# Patient Record
Sex: FEMALE
[De-identification: names, ages, dates, MRNs, and addresses within clinical notes are randomized per-mention and may not be internally consistent; named-entity substitution may affect disease eponyms.]

---

## 2022-06-23 ENCOUNTER — APPOINTMENT (OUTPATIENT)
Dept: BARIATRICS | Facility: CLINIC | Age: 47
End: 2022-06-23

## 2022-06-23 VITALS
DIASTOLIC BLOOD PRESSURE: 90 MMHG | TEMPERATURE: 97.6 F | HEIGHT: 61.5 IN | BODY MASS INDEX: 38.4 KG/M2 | WEIGHT: 206 LBS | OXYGEN SATURATION: 96 % | HEART RATE: 95 BPM | SYSTOLIC BLOOD PRESSURE: 137 MMHG

## 2022-06-23 DIAGNOSIS — K21.9 GASTRO-ESOPHAGEAL REFLUX DISEASE W/OUT ESOPHAGITIS: ICD-10-CM

## 2022-06-23 DIAGNOSIS — E66.01 MORBID (SEVERE) OBESITY DUE TO EXCESS CALORIES: ICD-10-CM

## 2022-06-23 DIAGNOSIS — Z00.00 ENCOUNTER FOR GENERAL ADULT MEDICAL EXAMINATION W/OUT ABNORMAL FINDINGS: ICD-10-CM

## 2022-06-23 PROCEDURE — 99204 OFFICE O/P NEW MOD 45 MIN: CPT

## 2022-06-29 NOTE — HISTORY OF PRESENT ILLNESS
[de-identified] : Patient is a 47 year year old F with pmhx of morbid obesity (BMI 38) and chronic GERD who presents today for initial bariatric consult. Patient c/o of severe acid reflux, heartburn and cough due to which she has difficulty sleeping. States she has had this problem for many years. Patient is currently on omeprazole for acid reflux. She has a PSHx of ear surgery in her childhood. She has been following up with EGD every 6 months (concerning for Crowe's, will obtain egd results and path) with her GI and has a study for her heart tomorrow. States she has a cardiac study tomorrow. Will obtain egd reports, send for cardiac and nutrition evaluation in preparation for possible medical necessity RYGB sx for severe gerd/possible crowe's esophagus.\par \par We discussed at length surgical and non-surgical options and that non surgical approaches are unlikely to lead to long term, sustained weight loss. We also discussed that surgery alone is unlikely to be successful but should rather be seen as a tool for weight loss to be integrated with physical activity and nutritional counseling. The patient verbalized understanding and agrees to proceed with the evaluation. All risks of surgery were explained to the patient including the risks of leaks, infections, blood clots and death.\par \par

## 2022-06-29 NOTE — ASSESSMENT
[FreeTextEntry1] : Pt is a 47 year year old F with a PMHx of morbid obesity (BMI 38) and PSHx of ear surgery who presents today doing well. Will begin bariatric workup including sleep study to assess obesity-related disordered breathing.\par

## 2022-06-29 NOTE — END OF VISIT
[FreeTextEntry3] : All medical record entries made by the Scribe were at my, OLGA Loera , direction and personally dictated by me on 06/23/2022 . I have reviewed the chart and agree that the record accurately reflects my personal performance of the history, physical exam, assessment and plan. I have also personally directed, reviewed, and agreed with the chart.\par  [Time Spent: ___ minutes] : I have spent [unfilled] minutes of time on the encounter.

## 2022-07-25 ENCOUNTER — APPOINTMENT (OUTPATIENT)
Dept: BARIATRICS | Facility: CLINIC | Age: 47
End: 2022-07-25

## 2022-08-08 ENCOUNTER — APPOINTMENT (OUTPATIENT)
Dept: BARIATRICS | Facility: CLINIC | Age: 47
End: 2022-08-08

## 2022-08-08 VITALS — WEIGHT: 210 LBS | BODY MASS INDEX: 39.04 KG/M2

## 2022-08-15 ENCOUNTER — APPOINTMENT (OUTPATIENT)
Dept: BARIATRICS | Facility: CLINIC | Age: 47
End: 2022-08-15

## 2024-11-26 NOTE — PLAN
[facility] follow up in home visit 11/25/24. Sitting up at table. Home remains unkempt and cluttered. Missed a week of meds over past 2 weeks. Continues to report she thinks she is taking them. Has not picked up Freestyle Shakir sensors from pharmacy, has not been checking BG or taking insulin consistently. Stated she has reconsidered and has now made the decision to move into AL at [facility] at the beginning of the year.  Is still expecting [employer] assessment. Will follow up in 2 weeks.    [FreeTextEntry1] : Patient is a 47 year year old F  with a morbid obesity (BMI 38) and severe chronic GERD who presents today for initial bariatric consult. In view of patient's severe acid reflux, patient will be scheduled for RYGB upon discussion on the next visit. Patient will get cardiac clearance and send the reports from her EGD and heart topography to the office. Will begin bariatric workup including sleep study to assess obesity-related disordered breathing.\par